# Patient Record
Sex: MALE | Race: WHITE | NOT HISPANIC OR LATINO | Employment: OTHER | URBAN - METROPOLITAN AREA
[De-identification: names, ages, dates, MRNs, and addresses within clinical notes are randomized per-mention and may not be internally consistent; named-entity substitution may affect disease eponyms.]

---

## 2023-12-15 ENCOUNTER — CLINICAL SUPPORT (OUTPATIENT)
Dept: AUDIOLOGY | Facility: CLINIC | Age: 78
End: 2023-12-15
Payer: MEDICARE

## 2023-12-15 DIAGNOSIS — H90.3 SENSORINEURAL HEARING LOSS (SNHL) OF BOTH EARS: Primary | ICD-10-CM

## 2023-12-15 PROCEDURE — HRANC PR HEARING AID NO CHARGE: Performed by: AUDIOLOGIST

## 2023-12-15 NOTE — PROGRESS NOTES
[facility] AUDIOLOGY  HEARING AID CHECK      Name:  Dave Caal  YOB: 1945  Today's date:  12/15/23      PATIENT ISSUES/CONCERNS AND OTOSCOPIC RESULTS  Otoscopic was clear.  Patient reports that he has not been hearing well from the right side.    HEARING AID INSPECTION AND ADJUSTMENT  Hearing aids were cleaned and checked.  Replaced filters, domes and sport locks.  Listening check was good for the recently repaired left aid but weak for the never repaired right aid.    Discussed options including repair versus replace.  Patient initially placed an order for new aids but called later to cancel the order.    He will call me when ready to proceed.    FOLLOW UP   The patient was asked to contact the office if they notice any significant change in hearing level or hearing aid function.    Otherwise, will follow up again in 6 months.    APPOINTMENT TIME  8:30-9:00am    Jose Greco  Doctor of Audiology  Licensed Audiologist

## 2023-12-28 ENCOUNTER — APPOINTMENT (OUTPATIENT)
Dept: AUDIOLOGY | Facility: CLINIC | Age: 78
End: 2023-12-28
Payer: MEDICARE